# Patient Record
Sex: FEMALE | Race: OTHER
[De-identification: names, ages, dates, MRNs, and addresses within clinical notes are randomized per-mention and may not be internally consistent; named-entity substitution may affect disease eponyms.]

---

## 2020-02-22 ENCOUNTER — HOSPITAL ENCOUNTER (EMERGENCY)
Dept: HOSPITAL 62 - ER | Age: 40
Discharge: HOME | End: 2020-02-22
Payer: COMMERCIAL

## 2020-02-22 VITALS — SYSTOLIC BLOOD PRESSURE: 133 MMHG | DIASTOLIC BLOOD PRESSURE: 77 MMHG

## 2020-02-22 DIAGNOSIS — N39.0: ICD-10-CM

## 2020-02-22 DIAGNOSIS — Z79.899: ICD-10-CM

## 2020-02-22 DIAGNOSIS — R09.89: ICD-10-CM

## 2020-02-22 DIAGNOSIS — J45.909: Primary | ICD-10-CM

## 2020-02-22 DIAGNOSIS — R10.30: ICD-10-CM

## 2020-02-22 DIAGNOSIS — R05: ICD-10-CM

## 2020-02-22 LAB
ADD MANUAL DIFF: NO
ALBUMIN SERPL-MCNC: 4.5 G/DL (ref 3.5–5)
ALP SERPL-CCNC: 73 U/L (ref 38–126)
ANION GAP SERPL CALC-SCNC: 12 MMOL/L (ref 5–19)
APPEARANCE UR: (no result)
APTT PPP: YELLOW S
AST SERPL-CCNC: 38 U/L (ref 14–36)
BASOPHILS # BLD AUTO: 0.1 10^3/UL (ref 0–0.2)
BASOPHILS NFR BLD AUTO: 1 % (ref 0–2)
BILIRUB DIRECT SERPL-MCNC: 0.3 MG/DL (ref 0–0.4)
BILIRUB SERPL-MCNC: 0.5 MG/DL (ref 0.2–1.3)
BILIRUB UR QL STRIP: NEGATIVE
BUN SERPL-MCNC: 7 MG/DL (ref 7–20)
CALCIUM: 9.4 MG/DL (ref 8.4–10.2)
CHLORIDE SERPL-SCNC: 104 MMOL/L (ref 98–107)
CO2 SERPL-SCNC: 25 MMOL/L (ref 22–30)
EOSINOPHIL # BLD AUTO: 0.3 10^3/UL (ref 0–0.6)
EOSINOPHIL NFR BLD AUTO: 3 % (ref 0–6)
ERYTHROCYTE [DISTWIDTH] IN BLOOD BY AUTOMATED COUNT: 13.7 % (ref 11.5–14)
GLUCOSE SERPL-MCNC: 95 MG/DL (ref 75–110)
GLUCOSE UR STRIP-MCNC: NEGATIVE MG/DL
HCT VFR BLD CALC: 40.1 % (ref 36–47)
HGB BLD-MCNC: 13.3 G/DL (ref 12–15.5)
KETONES UR STRIP-MCNC: NEGATIVE MG/DL
LYMPHOCYTES # BLD AUTO: 1.7 10^3/UL (ref 0.5–4.7)
LYMPHOCYTES NFR BLD AUTO: 15.6 % (ref 13–45)
MCH RBC QN AUTO: 28.6 PG (ref 27–33.4)
MCHC RBC AUTO-ENTMCNC: 33.1 G/DL (ref 32–36)
MCV RBC AUTO: 86 FL (ref 80–97)
MONOCYTES # BLD AUTO: 0.6 10^3/UL (ref 0.1–1.4)
MONOCYTES NFR BLD AUTO: 5.3 % (ref 3–13)
NEUTROPHILS # BLD AUTO: 8.3 10^3/UL (ref 1.7–8.2)
NEUTS SEG NFR BLD AUTO: 75.1 % (ref 42–78)
NITRITE UR QL STRIP: NEGATIVE
PH UR STRIP: 6 [PH] (ref 5–9)
PLATELET # BLD: 353 10^3/UL (ref 150–450)
POTASSIUM SERPL-SCNC: 4 MMOL/L (ref 3.6–5)
PROT SERPL-MCNC: 8.2 G/DL (ref 6.3–8.2)
PROT UR STRIP-MCNC: 30 MG/DL
RBC # BLD AUTO: 4.64 10^6/UL (ref 3.72–5.28)
SP GR UR STRIP: 1.03
TOTAL CELLS COUNTED % (AUTO): 100 %
UROBILINOGEN UR-MCNC: NEGATIVE MG/DL (ref ?–2)
WBC # BLD AUTO: 11 10^3/UL (ref 4–10.5)

## 2020-02-22 PROCEDURE — 81001 URINALYSIS AUTO W/SCOPE: CPT

## 2020-02-22 PROCEDURE — 36415 COLL VENOUS BLD VENIPUNCTURE: CPT

## 2020-02-22 PROCEDURE — 71046 X-RAY EXAM CHEST 2 VIEWS: CPT

## 2020-02-22 PROCEDURE — 85025 COMPLETE CBC W/AUTO DIFF WBC: CPT

## 2020-02-22 PROCEDURE — 84703 CHORIONIC GONADOTROPIN ASSAY: CPT

## 2020-02-22 PROCEDURE — 80053 COMPREHEN METABOLIC PANEL: CPT

## 2020-02-22 PROCEDURE — 99283 EMERGENCY DEPT VISIT LOW MDM: CPT

## 2020-02-22 NOTE — RADIOLOGY REPORT (SQ)
EXAM DESCRIPTION:  CHEST 2 VIEWS



COMPLETED DATE/TIME:  2/22/2020 11:22 am



REASON FOR STUDY:  cough x2 weeks



COMPARISON:  None.



EXAM PARAMETERS:  NUMBER OF VIEWS: two views

TECHNIQUE: Digital Frontal and Lateral radiographic views of the chest acquired.

RADIATION DOSE: NA

LIMITATIONS: none



FINDINGS:  LUNGS AND PLEURA: No opacities, masses or pneumothorax. No pleural effusion.

MEDIASTINUM AND HILAR STRUCTURES: No masses or contour abnormalities.

HEART AND VASCULAR STRUCTURES: Heart normal size.  No evidence for failure.

BONES: No acute findings.

HARDWARE: None in the chest.

OTHER: No other significant finding.



IMPRESSION:  NO ACUTE RADIOGRAPHIC FINDING IN THE CHEST.



TECHNICAL DOCUMENTATION:  JOB ID:  [id number]

 2011 [employer]- All Rights Reserved



Reading location - IP/workstation name: [email or web address]

## 2020-02-22 NOTE — ER DOCUMENT REPORT
ED Medical Screen (RME)





- General


Chief Complaint: Cough


Stated Complaint: COUGH,CONGESTION


Time Seen by Provider: 02/22/20 10:44


Mode of Arrival: Ambulatory


Information source: Patient


Notes: 





40-year-old female patient presenting to the emergency department multiple 

complaints.  Complaint #1 is cough, congestion and body aches that have been 

ongoing for 2 weeks.  Patient reports no fevers currently but did have a fever 

about a week ago.  Complaint #2 is mid to low abdominal pain, left-sided vaginal

pain and occasional dysuria.  Patient reports the symptoms have been ongoing for

2 days.  Patient does report history of asthma, she is concerned she may have 

bronchitis.





Exam:


Lung sounds clear and equal bilaterally, no increased work of breathing.





I have greeted and performed a rapid initial assessment of this patient.  A 

comprehensive ED assessment and evaluation of the patient, analysis of test 

results and completion of the medical decision making process will be conducted 

by additional ED providers.  I have specifically instructed the patient or 

family members with the patient to immediately return to any nursing staff 

should anything change in the patient's condition or with their chief complaint.








Physical Exam





- Vital signs


Vitals: 





                                        











Temp Pulse Resp BP Pulse Ox


 


 98.7 F   91   18   143/80 H  100 


 


 02/22/20 10:44  02/22/20 10:44  02/22/20 10:44  02/22/20 10:44  02/22/20 10:44














Course





- Vital Signs


Vital signs: 





                                        











Temp Pulse Resp BP Pulse Ox


 


 98.7 F   91   18   143/80 H  100 


 


 02/22/20 10:44  02/22/20 10:44  02/22/20 10:44  02/22/20 10:44  02/22/20 10:44

## 2020-02-22 NOTE — ER DOCUMENT REPORT
ED General





- General


Chief Complaint: Cough


Stated Complaint: COUGH,CONGESTION


Time Seen by Provider: 02/22/20 10:44


Primary Care Provider: 


TERE AMOS MD [ACTIVE STAFF] - Follow up as needed


Mode of Arrival: Ambulatory


Notes: 





40-year-old woman presents to the emergency department with a complaint of cough

and congestion for the last week.  She states that she traveled here from 

[place] and has had a worsening cough.  She has a history of asthma has been 

using her inhalers, using over-the-counter medications for cough without 

improvement.  She does note a tickling sensation in her throat and has not been 

using decongestants of any kind.  She denies allergies to medications and has a 

history of bronchitis in the past.  She also complains of a discomfort in the 

abdominal area.  She is concerned about her ability to get pregnant as she has 

been trying without success.  She denies dysuria, fever, back pain.


TRAVEL OUTSIDE OF THE [place] IN LAST 30 DAYS: No





- Related Data


Allergies/Adverse Reactions: 


                                        





No Known Allergies Allergy (Unverified 02/22/20 10:50)


   








Home Medications: Maxair





Past Medical History





- General


Information source: Patient





- Social History


Smoking Status: Never Smoker


Family History: Reviewed & Not Pertinent


Patient has suicidal ideation: No


Patient has homicidal ideation: No





Review of Systems





- Review of Systems


Notes: 





Constitutional: Negative for fever.


HENT: Negative for sore throat.


Eyes: Negative for visual changes.


Cardiovascular: Negative for chest pain.


Respiratory: + Cough, no shortness of breath.


Gastrointestinal: Negative for abdominal pain, vomiting or diarrhea.


Genitourinary: + Suprapubic pain episodes.


Musculoskeletal: Negative for back pain.


Skin: Negative for rash.


Neurological: Negative for headaches, weakness or numbness.





10 point ROS negative except as marked above and in HPI.





Physical Exam





- Vital signs


Vitals: 


                                        











Temp Pulse Resp BP Pulse Ox


 


 98.7 F   91   18   143/80 H  100 


 


 02/22/20 10:44  02/22/20 10:44  02/22/20 10:44  02/22/20 10:44  02/22/20 10:44














- Notes


Notes: 





PHYSICAL EXAMINATION:


 


Physical Exam:


General: Well-nourished well-developed 40-year-old woman in no acute distress


HEENT: NC/AT, pupils equal round and reactive to light, MM moist,nares clear, 

oropharynx clear, airway patent


Neck: supple, no adenopathy, no masses.  Good range of motion


Lungs: clear, no wheezing, no rales no rhonchi


CVS: Regular rate and rhythm no murmur gallop or rub


Abdomen: Soft, active, nontender, no masses, no hepatosplenomegaly


Ext:   No edema, clubbing or cyanosis.


Neuro: Alert and responsive, moving all 4 extremities on command, cranial nerves

intact, no focal findings


Skin: Intact no open lesions, no rash


PSYCH: Normal mood, normal affect.


 








Course





- Vital Signs


Vital signs: 


                                        











Temp Pulse Resp BP Pulse Ox


 


 98.7 F   86   18   133/77 H  99 


 


 02/22/20 12:39  02/22/20 12:39  02/22/20 12:39  02/22/20 12:39  02/22/20 12:39














- Laboratory


Result Diagrams: 


                                 02/22/20 11:42





                                 02/22/20 11:42


Laboratory results interpreted by me: 


                                        











  02/22/20 02/22/20 02/22/20





  11:07 11:42 11:42


 


WBC   11.0 H 


 


Absolute Neuts (auto)   8.3 H 


 


Creatinine    0.51 L


 


AST    38 H


 


ALT    43 H


 


Urine Protein  30 H  


 


Ur Leukocyte Esterase  LARGE H  


 


Urine Ascorbic Acid  40 H  











02/22/20 12:32


I have reviewed laboratory data and used this information for the treatment 

decisions regarding the patient.





- Diagnostic Test


Radiology reviewed: Image reviewed, Reports reviewed





Discharge





- Discharge


Clinical Impression: 


 Bronchitis, Cough





UTI (urinary tract infection)


Qualifiers:


 Urinary tract infection type: site unspecified Hematuria presence: without 

hematuria Qualified Code(s): N39.0 - Urinary tract infection, site not specified





Condition: Good


Disposition: HOME, SELF-CARE


Instructions:  Bronchitis ([facility]), Urinary Tract Infection ([facility])


Additional Instructions: 


You are diagnosed with bronchitis and UTI in the emergency department today.  

You been given a prescription for antibiotic which will cover infection in both 

areas as well as a prescription for a decongestant cough medication.  Please 

take the medications until completed.  You have voiced a concern about your 

OB/GYN health and wanted a referral to OB/GYN for an evaluation.  Dr. Amos is 

the OB/GYN on-call today.  I will give you the information regarding the 

referral and you can make an appointment as the time permits.  Please return to 

the emergency department if you have further difficulties or concerns.


Prescriptions: 


Cefdinir 300 mg PO BID #10 capsule


Desloratadine/Pseudoephedrine [Clarinex-D 12 Hour Tablet] 1 each PO BID #10 

tbmp.12hr


Promethazine/Dextromethorphan [Promethazine-Dm Syrup] 10 ml PO Q4 PRN #240 syrup


 PRN Reason: Cough


Referrals: 


TERE AMOS MD [ACTIVE STAFF] - Follow up as needed